# Patient Record
(demographics unavailable — no encounter records)

---

## 2025-04-21 NOTE — HISTORY OF PRESENT ILLNESS
[FreeTextEntry1] : 41 YO for annual exam. Pt reports in the past 6 months she has had three cycles that were 23 days apart. Her most recent period is 6 days late. She did have breast surgery twice in that time.

## 2025-04-21 NOTE — PLAN
[FreeTextEntry1] : 43 YO for annual exam w irregular periods 1. HCM -PAP done today -rx for mammogram and breast sono -pt had breast mri 10/2024 2. metrorrhagia -recent pelvic sono wnl -TSH, prolactin, dheas, free test, fsh and estradiol done today -likely related to surgery -keep menstrual calendar -rto in 1 year